# Patient Record
Sex: FEMALE | Race: OTHER | HISPANIC OR LATINO | URBAN - METROPOLITAN AREA
[De-identification: names, ages, dates, MRNs, and addresses within clinical notes are randomized per-mention and may not be internally consistent; named-entity substitution may affect disease eponyms.]

---

## 2017-03-11 ENCOUNTER — EMERGENCY (EMERGENCY)
Facility: HOSPITAL | Age: 32
LOS: 1 days | Discharge: HOME | End: 2017-03-11
Attending: STUDENT IN AN ORGANIZED HEALTH CARE EDUCATION/TRAINING PROGRAM | Admitting: STUDENT IN AN ORGANIZED HEALTH CARE EDUCATION/TRAINING PROGRAM
Payer: COMMERCIAL

## 2017-03-11 VITALS
HEART RATE: 81 BPM | HEIGHT: 61 IN | DIASTOLIC BLOOD PRESSURE: 81 MMHG | OXYGEN SATURATION: 100 % | SYSTOLIC BLOOD PRESSURE: 132 MMHG | WEIGHT: 149.91 LBS | TEMPERATURE: 98 F | RESPIRATION RATE: 18 BRPM

## 2017-03-11 DIAGNOSIS — R07.89 OTHER CHEST PAIN: ICD-10-CM

## 2017-03-11 DIAGNOSIS — I10 ESSENTIAL (PRIMARY) HYPERTENSION: ICD-10-CM

## 2017-03-11 PROCEDURE — 71020: CPT | Mod: 26

## 2017-03-11 PROCEDURE — 93010 ELECTROCARDIOGRAM REPORT: CPT

## 2017-03-11 PROCEDURE — 99285 EMERGENCY DEPT VISIT HI MDM: CPT

## 2017-03-11 RX ORDER — ACETAMINOPHEN 500 MG
975 TABLET ORAL ONCE
Qty: 0 | Refills: 0 | Status: COMPLETED | OUTPATIENT
Start: 2017-03-11 | End: 2017-03-11

## 2017-03-11 RX ADMIN — Medication 975 MILLIGRAM(S): at 23:03

## 2017-03-11 NOTE — ED ADULT TRIAGE NOTE - RESPIRATORY RATE (BREATHS/MIN)
18 I have reviewed and agree with all pertinent clinical information, including history and physical exam and agree with treatment plan of the PA.

## 2017-03-11 NOTE — ED PROVIDER NOTE - NS ED MD SCRIBE ATTENDING SCRIBE SECTIONS
PHYSICAL EXAM/PAST MEDICAL/SURGICAL/SOCIAL HISTORY/REVIEW OF SYSTEMS/VITAL SIGNS( Pullset)/INTAKE ASSESSMENT/SCREENINGS/PROGRESS NOTE/CONSULTATIONS/SHIFT CHANGE/HIV/RESULTS/HISTORY OF PRESENT ILLNESS/DISPOSITION

## 2017-03-11 NOTE — ED PROVIDER NOTE - OBJECTIVE STATEMENT
hx of aortic insufficiency and HTN who presents to the ED c/o CP that onset today. Pain is described as pressure "As if ribs pushing inwards". Denies radiation. Has had similar sx over the past week. Worse with change in position. Denies fever, chills, lifting, running, HA, blurry vision, numbness, tingling, back pain, leg pain or diaphoresis. Had a normal echo and stress test 1 year ago. Took ibuprofen without relieve. Had similar sx about 2 years ago with swelling to her ankles. PCP Dr. Jimenez.  No further complaints at this time. hx of aortic insufficiency and HTN who presents to the ED c/o CP that onset today. Pain is described as pressure "As if ribs pushing inwards". Denies radiation. Has had similar sx over the past week. Worse with change in position and palpation. Denies fever, chills, lifting, running, HA, blurry vision, numbness, tingling, back pain, leg pain or diaphoresis. Had a normal echo and stress test 1 year ago. Took ibuprofen without relieve. Had similar sx about 2 years ago with swelling to her ankles. PCP Dr. Jimenez.  No further complaints at this time.

## 2017-03-12 PROBLEM — Z00.00 ENCOUNTER FOR PREVENTIVE HEALTH EXAMINATION: Status: ACTIVE | Noted: 2017-03-12

## 2017-03-12 LAB
ALBUMIN SERPL ELPH-MCNC: 4.7 G/DL — SIGNIFICANT CHANGE UP (ref 3.3–5.2)
ALP SERPL-CCNC: 61 U/L — SIGNIFICANT CHANGE UP (ref 40–120)
ALT FLD-CCNC: 14 U/L — SIGNIFICANT CHANGE UP
ANION GAP SERPL CALC-SCNC: 14 MMOL/L — SIGNIFICANT CHANGE UP (ref 5–17)
AST SERPL-CCNC: 22 U/L — SIGNIFICANT CHANGE UP
BILIRUB SERPL-MCNC: 0.4 MG/DL — SIGNIFICANT CHANGE UP (ref 0.4–2)
BUN SERPL-MCNC: 14 MG/DL — SIGNIFICANT CHANGE UP (ref 8–20)
CALCIUM SERPL-MCNC: 9.4 MG/DL — SIGNIFICANT CHANGE UP (ref 8.6–10.2)
CHLORIDE SERPL-SCNC: 100 MMOL/L — SIGNIFICANT CHANGE UP (ref 98–107)
CO2 SERPL-SCNC: 26 MMOL/L — SIGNIFICANT CHANGE UP (ref 22–29)
CREAT SERPL-MCNC: 0.62 MG/DL — SIGNIFICANT CHANGE UP (ref 0.5–1.3)
D DIMER BLD IA.RAPID-MCNC: 155 NG/ML DDU — SIGNIFICANT CHANGE UP
ERYTHROCYTE [SEDIMENTATION RATE] IN BLOOD: 20 MM/HR — SIGNIFICANT CHANGE UP (ref 0–20)
GLUCOSE SERPL-MCNC: 101 MG/DL — SIGNIFICANT CHANGE UP (ref 70–115)
HCT VFR BLD CALC: 40.1 % — SIGNIFICANT CHANGE UP (ref 37–47)
HGB BLD-MCNC: 13.8 G/DL — SIGNIFICANT CHANGE UP (ref 12–16)
LIDOCAIN IGE QN: 47 U/L — SIGNIFICANT CHANGE UP (ref 22–51)
MCHC RBC-ENTMCNC: 31.2 PG — HIGH (ref 27–31)
MCHC RBC-ENTMCNC: 34.4 G/DL — SIGNIFICANT CHANGE UP (ref 32–36)
MCV RBC AUTO: 90.5 FL — SIGNIFICANT CHANGE UP (ref 81–99)
PLATELET # BLD AUTO: 262 K/UL — SIGNIFICANT CHANGE UP (ref 150–400)
POTASSIUM SERPL-MCNC: 3.3 MMOL/L — LOW (ref 3.5–5.3)
POTASSIUM SERPL-SCNC: 3.3 MMOL/L — LOW (ref 3.5–5.3)
PROT SERPL-MCNC: 8.4 G/DL — SIGNIFICANT CHANGE UP (ref 6.6–8.7)
RBC # BLD: 4.43 M/UL — SIGNIFICANT CHANGE UP (ref 4.4–5.2)
RBC # FLD: 12.2 % — SIGNIFICANT CHANGE UP (ref 11–15.6)
SODIUM SERPL-SCNC: 140 MMOL/L — SIGNIFICANT CHANGE UP (ref 135–145)
TROPONIN T SERPL-MCNC: <0.01 NG/ML — SIGNIFICANT CHANGE UP (ref 0–0.06)
WBC # BLD: 9 K/UL — SIGNIFICANT CHANGE UP (ref 4.8–10.8)
WBC # FLD AUTO: 9 K/UL — SIGNIFICANT CHANGE UP (ref 4.8–10.8)

## 2017-03-12 PROCEDURE — 80053 COMPREHEN METABOLIC PANEL: CPT

## 2017-03-12 PROCEDURE — 71046 X-RAY EXAM CHEST 2 VIEWS: CPT

## 2017-03-12 PROCEDURE — 85652 RBC SED RATE AUTOMATED: CPT

## 2017-03-12 PROCEDURE — 36415 COLL VENOUS BLD VENIPUNCTURE: CPT

## 2017-03-12 PROCEDURE — 84484 ASSAY OF TROPONIN QUANT: CPT

## 2017-03-12 PROCEDURE — 85027 COMPLETE CBC AUTOMATED: CPT

## 2017-03-12 PROCEDURE — 83690 ASSAY OF LIPASE: CPT

## 2017-03-12 PROCEDURE — 85379 FIBRIN DEGRADATION QUANT: CPT

## 2017-03-12 PROCEDURE — 93005 ELECTROCARDIOGRAM TRACING: CPT

## 2017-03-12 PROCEDURE — 99283 EMERGENCY DEPT VISIT LOW MDM: CPT | Mod: 25

## 2017-12-13 ENCOUNTER — EMERGENCY (EMERGENCY)
Facility: HOSPITAL | Age: 32
LOS: 1 days | Discharge: HOME | End: 2017-12-13
Attending: EMERGENCY MEDICINE | Admitting: EMERGENCY MEDICINE
Payer: COMMERCIAL

## 2017-12-13 VITALS
WEIGHT: 139.99 LBS | SYSTOLIC BLOOD PRESSURE: 156 MMHG | HEIGHT: 62 IN | TEMPERATURE: 98 F | HEART RATE: 101 BPM | RESPIRATION RATE: 20 BRPM | DIASTOLIC BLOOD PRESSURE: 94 MMHG | OXYGEN SATURATION: 99 %

## 2017-12-13 VITALS
RESPIRATION RATE: 17 BRPM | HEART RATE: 72 BPM | DIASTOLIC BLOOD PRESSURE: 78 MMHG | SYSTOLIC BLOOD PRESSURE: 133 MMHG | TEMPERATURE: 98 F | OXYGEN SATURATION: 97 %

## 2017-12-13 LAB
ANION GAP SERPL CALC-SCNC: 13 MMOL/L — SIGNIFICANT CHANGE UP (ref 5–17)
BUN SERPL-MCNC: 13 MG/DL — SIGNIFICANT CHANGE UP (ref 8–20)
CALCIUM SERPL-MCNC: 9.8 MG/DL — SIGNIFICANT CHANGE UP (ref 8.6–10.2)
CHLORIDE SERPL-SCNC: 97 MMOL/L — LOW (ref 98–107)
CO2 SERPL-SCNC: 24 MMOL/L — SIGNIFICANT CHANGE UP (ref 22–29)
CREAT SERPL-MCNC: 0.66 MG/DL — SIGNIFICANT CHANGE UP (ref 0.5–1.3)
GLUCOSE SERPL-MCNC: 90 MG/DL — SIGNIFICANT CHANGE UP (ref 70–115)
HCT VFR BLD CALC: 40 % — SIGNIFICANT CHANGE UP (ref 37–47)
HGB BLD-MCNC: 13.5 G/DL — SIGNIFICANT CHANGE UP (ref 12–16)
MCHC RBC-ENTMCNC: 30.1 PG — SIGNIFICANT CHANGE UP (ref 27–31)
MCHC RBC-ENTMCNC: 33.8 G/DL — SIGNIFICANT CHANGE UP (ref 32–36)
MCV RBC AUTO: 89.3 FL — SIGNIFICANT CHANGE UP (ref 81–99)
PLATELET # BLD AUTO: 172 K/UL — SIGNIFICANT CHANGE UP (ref 150–400)
POTASSIUM SERPL-MCNC: 4.3 MMOL/L — SIGNIFICANT CHANGE UP (ref 3.5–5.3)
POTASSIUM SERPL-SCNC: 4.3 MMOL/L — SIGNIFICANT CHANGE UP (ref 3.5–5.3)
RBC # BLD: 4.48 M/UL — SIGNIFICANT CHANGE UP (ref 4.4–5.2)
RBC # FLD: 12.4 % — SIGNIFICANT CHANGE UP (ref 11–15.6)
SODIUM SERPL-SCNC: 134 MMOL/L — LOW (ref 135–145)
TROPONIN T SERPL-MCNC: <0.01 NG/ML — SIGNIFICANT CHANGE UP (ref 0–0.06)
TROPONIN T SERPL-MCNC: <0.01 NG/ML — SIGNIFICANT CHANGE UP (ref 0–0.06)
WBC # BLD: 4.7 K/UL — LOW (ref 4.8–10.8)
WBC # FLD AUTO: 4.7 K/UL — LOW (ref 4.8–10.8)

## 2017-12-13 PROCEDURE — 99283 EMERGENCY DEPT VISIT LOW MDM: CPT | Mod: 25

## 2017-12-13 PROCEDURE — 99285 EMERGENCY DEPT VISIT HI MDM: CPT

## 2017-12-13 PROCEDURE — 85027 COMPLETE CBC AUTOMATED: CPT

## 2017-12-13 PROCEDURE — 84484 ASSAY OF TROPONIN QUANT: CPT

## 2017-12-13 PROCEDURE — 36415 COLL VENOUS BLD VENIPUNCTURE: CPT

## 2017-12-13 PROCEDURE — 80048 BASIC METABOLIC PNL TOTAL CA: CPT

## 2017-12-13 RX ORDER — METOPROLOL TARTRATE 50 MG
75 TABLET ORAL
Qty: 0 | Refills: 0 | COMMUNITY

## 2017-12-13 RX ORDER — LABETALOL HCL 100 MG
400 TABLET ORAL
Qty: 0 | Refills: 0 | COMMUNITY

## 2017-12-13 RX ORDER — ASPIRIN/CALCIUM CARB/MAGNESIUM 324 MG
325 TABLET ORAL ONCE
Qty: 0 | Refills: 0 | Status: COMPLETED | OUTPATIENT
Start: 2017-12-13 | End: 2017-12-13

## 2017-12-13 RX ORDER — MONTELUKAST 4 MG/1
1 TABLET, CHEWABLE ORAL
Qty: 0 | Refills: 0 | COMMUNITY

## 2017-12-13 RX ADMIN — Medication 325 MILLIGRAM(S): at 13:55

## 2017-12-13 NOTE — ED ADULT TRIAGE NOTE - CHIEF COMPLAINT QUOTE
pt states that she has left sided chest pain for on and off for one month . pt states that it has increased in intensity and she now has left arm pain. pt has seen her PMD and cardiologist. pt states that she is to have a stress echo Monday

## 2017-12-13 NOTE — ED PROVIDER NOTE - NS ED ROS FT
Denies HA, dizziness, blurry vision, sore throat, coughing, SOB,  nausea, vomiting, abdominal pain, flank pain, diarrhea, constipation, blood in stool, urinary frequency/urgency/dysuria, hematuria, LE edema, numbness, weakness or rashes.

## 2017-12-13 NOTE — ED PROVIDER NOTE - CONSTITUTIONAL, MLM
normal... Well appearing, well nourished, awake, alert, oriented to person, place, time/situation and in no apparent distress. Patient is tearful.

## 2017-12-13 NOTE — ED PROVIDER NOTE - OBJECTIVE STATEMENT
Patient presents with left sided chest pain which is constant, but intermittently worsening over the past month. She saw her cardiologist and her PMD who did normal EKG's and is scheduled for a stress echo with her cardiologist in 5 days, but the pain continues to worsen so she came to the ED. She denies any associated SOB, nausea, vomiting, diaphoresis. She denies any inciting injury. She was told by her cardiologist to take aspirin, which she took 325 mg x 3 TID for a week, but self D/C'd because she had tinnitis, which resolved when she stopped. She notes that the ASA did help with the pain. She has otherwise been well, denies fevers, coughing, sore throat, abdominal pain, diarrhea urinary symptoms or LE edema.

## 2017-12-13 NOTE — ED PROVIDER NOTE - MEDICAL DECISION MAKING DETAILS
Patient presents with chest pain x 1 month worse with movement and radiation to her left arm. She has point tenderness over the sternal cartilage. EKG shows TWI in III and aVF. Will treat with 325 ASA (patient likely has ASA toxicity when she was taking large amounts of ASA daily), cycle troponin for ACS rule out.

## 2017-12-13 NOTE — ED PROVIDER NOTE - CHPI ED SYMPTOMS NEG
no nausea/no syncope/no cough/no vomiting/no dizziness/no fever/no chills/no shortness of breath/no diaphoresis/no back pain

## 2019-12-13 PROBLEM — I10 ESSENTIAL (PRIMARY) HYPERTENSION: Chronic | Status: ACTIVE | Noted: 2017-12-13

## 2020-02-12 ENCOUNTER — APPOINTMENT (OUTPATIENT)
Dept: DERMATOLOGY | Facility: CLINIC | Age: 35
End: 2020-02-12

## 2022-04-13 ENCOUNTER — APPOINTMENT (OUTPATIENT)
Dept: ORTHOPEDIC SURGERY | Facility: CLINIC | Age: 37
End: 2022-04-13

## 2022-05-16 PROBLEM — Z00.00 ENCOUNTER FOR PREVENTIVE HEALTH EXAMINATION: Noted: 2022-05-16

## 2022-10-19 ENCOUNTER — APPOINTMENT (OUTPATIENT)
Dept: ORTHOPEDIC SURGERY | Facility: CLINIC | Age: 37
End: 2022-10-19

## 2022-10-19 VITALS — HEIGHT: 61 IN | WEIGHT: 165 LBS | BODY MASS INDEX: 31.15 KG/M2

## 2022-10-19 DIAGNOSIS — I10 ESSENTIAL (PRIMARY) HYPERTENSION: ICD-10-CM

## 2022-10-19 DIAGNOSIS — E78.00 PURE HYPERCHOLESTEROLEMIA, UNSPECIFIED: ICD-10-CM

## 2022-10-19 PROCEDURE — 99214 OFFICE O/P EST MOD 30 MIN: CPT

## 2022-10-19 NOTE — HISTORY OF PRESENT ILLNESS
[5] : 5 [3] : 3 [Full time] : Work status: full time [de-identified] : 38 y/o female presents for re-evaluation. Patient reports that since her last visit she completed her PT trials and has been following the HEP provided to her previously. Patient reports a recurrence of pain 1 week ago that started after she lifted her child. Patient c/o lower back pain radiating into her bilateral hips.  [] : no [de-identified] : p/t [de-identified] :

## 2022-10-19 NOTE — ASSESSMENT
[FreeTextEntry1] : 38 y/o female with lumbar myofascial strain (Resolving). I advised the patient to continue her current HEP. Advised the patient to continue taking 600mg Ibuprofen PRN. Advised the patient that if her pain doesn't improve in the next week, we will consider obtaining MRI's of the lumbar spine.  I would like to follow up with the patient on an as needed basis moving forward. \par \par Prior to appointment and during encounter with patient extensive medical records were reviewed including but not limited to, hospital records, out patient records, imaging results, and lab data. During this appointment the patient was examined, diagnoses were discussed and explained in a face to face manner. In addition extensive time was spent reviewing aforementioned diagnostic studies. Counseling including abnormal image results, differential diagnoses, treatment options, risk and benefits, lifestyle changes, current condition, and current medications was performed. Patient's comments, questions, and concerns were address and patient verbalized understanding. Based on this patient's presentation at our office, which is an orthopedic spine surgeon's office, this patient inherently / intrinsically has a risk, however minute, of developing issues such as Cauda equina syndrome, bowel and bladder changes, or progression of motor or neurological deficits such as paralysis which may be permanent.\par \par I, Christopher Leblanc, attest that this documentation has been prepared under the direction and in the presence of provider Krzysztof Mcqueen MD.

## 2022-10-19 NOTE — PHYSICAL EXAM
[5___] : right extensor hallicus longus 5[unfilled]/5 [de-identified] : Constitutional:\par -  General Appearance: \par Unremarkable\par Body Habitus\par Well Developed \par Well Nourished\par Body Habitus\par No Deformities\par Well Groomed\par \par Ability To communicate:\par Normal\par \par Neurologic: \par Global sensation is intact to upper and lower extremities.  See examination of Neck and/or Spine for exceptions.\par Orientation to Time, Place and Person is: Normal\par Mood And Affect is Normal\par \par Skin:\par -  Head/Face, Right Upper/Lower Extremity, Left Upper/Lower Extremity: Normal\par See Examination of Neck and/or Spine for exceptions\par \par Cardiovascular:\par Peripheral Cardiovascular System is Normal\par \par Palpation of Lymph Nodes:\par Normal Palpation of lymph nodes in: Axilla, Cervical, Inguinal\par Abnormal Palpation of lymph nodes in: None  [] : non-antalgic [FreeTextEntry8] : Right PSIS pain

## 2022-11-09 ENCOUNTER — EMERGENCY (EMERGENCY)
Facility: HOSPITAL | Age: 37
LOS: 1 days | Discharge: HOME | End: 2022-11-09
Attending: STUDENT IN AN ORGANIZED HEALTH CARE EDUCATION/TRAINING PROGRAM
Payer: COMMERCIAL

## 2022-11-09 VITALS
RESPIRATION RATE: 18 BRPM | OXYGEN SATURATION: 99 % | DIASTOLIC BLOOD PRESSURE: 89 MMHG | HEART RATE: 70 BPM | SYSTOLIC BLOOD PRESSURE: 139 MMHG | TEMPERATURE: 99 F

## 2022-11-09 VITALS
RESPIRATION RATE: 16 BRPM | TEMPERATURE: 98 F | SYSTOLIC BLOOD PRESSURE: 143 MMHG | HEART RATE: 82 BPM | DIASTOLIC BLOOD PRESSURE: 76 MMHG | WEIGHT: 164.91 LBS | HEIGHT: 67 IN | OXYGEN SATURATION: 99 %

## 2022-11-09 LAB
ALBUMIN SERPL ELPH-MCNC: 5 G/DL — SIGNIFICANT CHANGE UP (ref 3.3–5.2)
ALP SERPL-CCNC: 58 U/L — SIGNIFICANT CHANGE UP (ref 40–120)
ALT FLD-CCNC: 19 U/L — SIGNIFICANT CHANGE UP
ANION GAP SERPL CALC-SCNC: 12 MMOL/L — SIGNIFICANT CHANGE UP (ref 5–17)
AST SERPL-CCNC: 25 U/L — SIGNIFICANT CHANGE UP
BASOPHILS # BLD AUTO: 0.03 K/UL — SIGNIFICANT CHANGE UP (ref 0–0.2)
BASOPHILS NFR BLD AUTO: 0.6 % — SIGNIFICANT CHANGE UP (ref 0–2)
BILIRUB SERPL-MCNC: 0.5 MG/DL — SIGNIFICANT CHANGE UP (ref 0.4–2)
BLD GP AB SCN SERPL QL: SIGNIFICANT CHANGE UP
BUN SERPL-MCNC: 14.1 MG/DL — SIGNIFICANT CHANGE UP (ref 8–20)
CALCIUM SERPL-MCNC: 9.7 MG/DL — SIGNIFICANT CHANGE UP (ref 8.4–10.5)
CHLORIDE SERPL-SCNC: 99 MMOL/L — SIGNIFICANT CHANGE UP (ref 96–108)
CO2 SERPL-SCNC: 25 MMOL/L — SIGNIFICANT CHANGE UP (ref 22–29)
CREAT SERPL-MCNC: 0.77 MG/DL — SIGNIFICANT CHANGE UP (ref 0.5–1.3)
EGFR: 103 ML/MIN/1.73M2 — SIGNIFICANT CHANGE UP
EOSINOPHIL # BLD AUTO: 0.07 K/UL — SIGNIFICANT CHANGE UP (ref 0–0.5)
EOSINOPHIL NFR BLD AUTO: 1.3 % — SIGNIFICANT CHANGE UP (ref 0–6)
GLUCOSE SERPL-MCNC: 85 MG/DL — SIGNIFICANT CHANGE UP (ref 70–99)
HCG SERPL-ACNC: <4 MIU/ML — SIGNIFICANT CHANGE UP
HCT VFR BLD CALC: 38.3 % — SIGNIFICANT CHANGE UP (ref 34.5–45)
HGB BLD-MCNC: 13.2 G/DL — SIGNIFICANT CHANGE UP (ref 11.5–15.5)
IMM GRANULOCYTES NFR BLD AUTO: 0.2 % — SIGNIFICANT CHANGE UP (ref 0–0.9)
LACTATE BLDV-MCNC: 2.2 MMOL/L — HIGH (ref 0.5–2)
LIDOCAIN IGE QN: 41 U/L — SIGNIFICANT CHANGE UP (ref 22–51)
LYMPHOCYTES # BLD AUTO: 1.25 K/UL — SIGNIFICANT CHANGE UP (ref 1–3.3)
LYMPHOCYTES # BLD AUTO: 23.8 % — SIGNIFICANT CHANGE UP (ref 13–44)
MCHC RBC-ENTMCNC: 30.7 PG — SIGNIFICANT CHANGE UP (ref 27–34)
MCHC RBC-ENTMCNC: 34.5 GM/DL — SIGNIFICANT CHANGE UP (ref 32–36)
MCV RBC AUTO: 89.1 FL — SIGNIFICANT CHANGE UP (ref 80–100)
MONOCYTES # BLD AUTO: 0.47 K/UL — SIGNIFICANT CHANGE UP (ref 0–0.9)
MONOCYTES NFR BLD AUTO: 8.9 % — SIGNIFICANT CHANGE UP (ref 2–14)
NEUTROPHILS # BLD AUTO: 3.43 K/UL — SIGNIFICANT CHANGE UP (ref 1.8–7.4)
NEUTROPHILS NFR BLD AUTO: 65.2 % — SIGNIFICANT CHANGE UP (ref 43–77)
PLATELET # BLD AUTO: 235 K/UL — SIGNIFICANT CHANGE UP (ref 150–400)
POTASSIUM SERPL-MCNC: 4.5 MMOL/L — SIGNIFICANT CHANGE UP (ref 3.5–5.3)
POTASSIUM SERPL-SCNC: 4.5 MMOL/L — SIGNIFICANT CHANGE UP (ref 3.5–5.3)
PROT SERPL-MCNC: 7.9 G/DL — SIGNIFICANT CHANGE UP (ref 6.6–8.7)
RBC # BLD: 4.3 M/UL — SIGNIFICANT CHANGE UP (ref 3.8–5.2)
RBC # FLD: 11.9 % — SIGNIFICANT CHANGE UP (ref 10.3–14.5)
SARS-COV-2 RNA SPEC QL NAA+PROBE: SIGNIFICANT CHANGE UP
SODIUM SERPL-SCNC: 136 MMOL/L — SIGNIFICANT CHANGE UP (ref 135–145)
WBC # BLD: 5.26 K/UL — SIGNIFICANT CHANGE UP (ref 3.8–10.5)
WBC # FLD AUTO: 5.26 K/UL — SIGNIFICANT CHANGE UP (ref 3.8–10.5)

## 2022-11-09 PROCEDURE — 84702 CHORIONIC GONADOTROPIN TEST: CPT

## 2022-11-09 PROCEDURE — 86900 BLOOD TYPING SEROLOGIC ABO: CPT

## 2022-11-09 PROCEDURE — 36415 COLL VENOUS BLD VENIPUNCTURE: CPT

## 2022-11-09 PROCEDURE — 86850 RBC ANTIBODY SCREEN: CPT

## 2022-11-09 PROCEDURE — 74177 CT ABD & PELVIS W/CONTRAST: CPT | Mod: 26,MA

## 2022-11-09 PROCEDURE — 96361 HYDRATE IV INFUSION ADD-ON: CPT

## 2022-11-09 PROCEDURE — 86901 BLOOD TYPING SEROLOGIC RH(D): CPT

## 2022-11-09 PROCEDURE — U0003: CPT

## 2022-11-09 PROCEDURE — 80053 COMPREHEN METABOLIC PANEL: CPT

## 2022-11-09 PROCEDURE — 99284 EMERGENCY DEPT VISIT MOD MDM: CPT | Mod: 25

## 2022-11-09 PROCEDURE — 74177 CT ABD & PELVIS W/CONTRAST: CPT | Mod: MA

## 2022-11-09 PROCEDURE — 83690 ASSAY OF LIPASE: CPT

## 2022-11-09 PROCEDURE — 85025 COMPLETE CBC W/AUTO DIFF WBC: CPT

## 2022-11-09 PROCEDURE — 99285 EMERGENCY DEPT VISIT HI MDM: CPT

## 2022-11-09 PROCEDURE — U0005: CPT

## 2022-11-09 PROCEDURE — 83605 ASSAY OF LACTIC ACID: CPT

## 2022-11-09 PROCEDURE — 96360 HYDRATION IV INFUSION INIT: CPT | Mod: XU

## 2022-11-09 RX ORDER — SODIUM CHLORIDE 9 MG/ML
1000 INJECTION INTRAMUSCULAR; INTRAVENOUS; SUBCUTANEOUS ONCE
Refills: 0 | Status: COMPLETED | OUTPATIENT
Start: 2022-11-09 | End: 2022-11-09

## 2022-11-09 RX ORDER — RADIOPAQUE PVC MARKERS/BARIUM 24MARKERS
900 CAPSULE ORAL ONCE
Refills: 0 | Status: COMPLETED | OUTPATIENT
Start: 2022-11-09 | End: 2022-11-09

## 2022-11-09 RX ADMIN — SODIUM CHLORIDE 1000 MILLILITER(S): 9 INJECTION INTRAMUSCULAR; INTRAVENOUS; SUBCUTANEOUS at 11:41

## 2022-11-09 RX ADMIN — Medication 900 MILLILITER(S): at 11:10

## 2022-11-09 RX ADMIN — SODIUM CHLORIDE 1000 MILLILITER(S): 9 INJECTION INTRAMUSCULAR; INTRAVENOUS; SUBCUTANEOUS at 13:57

## 2022-11-09 RX ADMIN — SODIUM CHLORIDE 1000 MILLILITER(S): 9 INJECTION INTRAMUSCULAR; INTRAVENOUS; SUBCUTANEOUS at 11:11

## 2022-11-09 NOTE — ED ADULT NURSE NOTE - OBJECTIVE STATEMENT
Pt. is a 36 yo F who p/w c/o abdominal pain. Pt. A&Ox4 and amb. Pmhx HTN. Pt. endorsing suprapubic pain ongoing for multiple days. Endorses GERD like symptoms as well. Pt. states she noticed a small amount of blood in her stool this morning prompting her to come to the ED. Denies N/V/D/fevers/chills/SOB/CP. Abdomen soft, non-distended, non-tender on palpation. Resp. equal and unlabored b/l. VSS. NAD. #20g PIV established to RAC. Labs sent. Medicated per Md orders. Comfort measures provided, safety measures implemented, call bell in reach. MD at bedside.

## 2022-11-09 NOTE — ED STATDOCS - NS ED ATTENDING STATEMENT MOD
This was a shared visit with the LARISA. I reviewed and verified the documentation and independently performed the documented:

## 2022-11-09 NOTE — ED STATDOCS - ATTENDING APP SHARED VISIT CONTRIBUTION OF CARE
I, Lacy Morrison, performed the initial face to face bedside interview with this patient regarding history of present illness, review of symptoms and relevant past medical, social and family history.  I completed an independent physical examination.  I was the initial provider who evaluated this patient. I have signed out the follow up of any pending tests (i.e. labs, radiological studies) to the ACP.  I have communicated the patient’s plan of care and disposition with the ACP.  The history, relevant review of systems, past medical and surgical history, medical decision making, and physical examination was documented by the scribe in my presence and I attest to the accuracy of the documentation.

## 2022-11-09 NOTE — ED STATDOCS - CARE PROVIDER_API CALL
Abhishek Salas)  Gastroenterology; Internal Medicine  [address] [place] [address]  Phone: [phone]  Fax: [phone]  Follow Up Time:

## 2022-11-09 NOTE — ED STATDOCS - PATIENT PORTAL LINK FT
You can access the FollowMyHealth Patient Portal offered by [facility] by registering at the following website: [email or web address]. By joining [email or web address] FollowMyHealth portal, you will also be able to view your health information using other applications (apps) compatible with our system.

## 2022-11-09 NOTE — ED ADULT TRIAGE NOTE - CHIEF COMPLAINT QUOTE
Pt started with abdominal pain a few days ago. Then started to develop indigestion and reflux a few days ago. This morning she had dark red blood in her stool.

## 2022-11-09 NOTE — ED STATDOCS - NSFOLLOWUPINSTRUCTIONS_ED_ALL_ED_FT
- Return to the ED for any new or worsening symptoms.   - Follow up with your doctor within 2-3 days.   - Follow-up with GI doctor for further evaluation of symptoms    Abdominal Pain    Many things can cause abdominal pain. Many times, abdominal pain is not caused by a disease and will improve without treatment. Your health care provider will do a physical exam to determine if there is a dangerous cause of your pain; blood tests and imaging may help determine the cause of your pain. However, in many cases, no cause may be found and you may need further testing as an outpatient. Monitor your abdominal pain for any changes.     SEEK IMMEDIATE MEDICAL CARE IF YOU HAVE ANY OF THE FOLLOWING SYMPTOMS: worsening abdominal pain, uncontrollable vomiting, profuse diarrhea, inability to have bowel movements or pass gas, black or bloody stools, fever accompanying chest pain or back pain, or fainting. These symptoms may represent a serious problem that is an emergency. Do not wait to see if the symptoms will go away. Get medical help right away. Call 911 and do not drive yourself to the hospital.

## 2022-11-09 NOTE — ED STATDOCS - PROGRESS NOTE DETAILS
ERIC Andino: PT evaluated by intake physician. HPI/PE/ROS as noted above. Will follow up plan per intake physician. Pt reassessed, pain minimal, still reporting lower abd cramping. Results reviewed thus far, pending ct scan ERIC Andino: ct negative. results reviewed with pt. pt states she has had some diarrhea past few days with her abd cramping, possible viral enteritis. educated on supportive care for symptoms and BRAT diet. has GI appt scheduled in January. left message with ED referral coordinator to try to expedite appt. provided copy of all results. return precautions discussed

## 2022-11-09 NOTE — ED STATDOCS - GASTROINTESTINAL, MLM
abdomen soft,(+) Mid epigastric, and RLQ TTP, (+) McBurney's, and non-distended. Bowel sounds present; Rectal exam: (+) external, and internal hemorrhoids

## 2022-11-09 NOTE — ED STATDOCS - OBJECTIVE STATEMENT
36 y/o female with PMHx of HTN, and Hemorrhoids presents to ED c/o abdominal pain. Patient reports intermittent diffuse upper abdominal pain, and constant lower abdominal pressure with associated reflux x5 days. This morning, patient reports she passed a small blood clot with stool. Saw her OBGYN 2 days ago had a negative US. Also saw her PMD yesterday, had a negative UA. States only recent take out food was 6 days ago, had a lobster roll from a restaurant.     Denies constipation, N/V/D, fevers, chills, shortness of breath, chest pain  LNMP: 10/31/22

## 2022-11-15 ENCOUNTER — APPOINTMENT (OUTPATIENT)
Dept: GASTROENTEROLOGY | Facility: CLINIC | Age: 37
End: 2022-11-15

## 2022-11-15 VITALS
WEIGHT: 170 LBS | DIASTOLIC BLOOD PRESSURE: 93 MMHG | HEART RATE: 77 BPM | OXYGEN SATURATION: 98 % | RESPIRATION RATE: 14 BRPM | BODY MASS INDEX: 32.1 KG/M2 | SYSTOLIC BLOOD PRESSURE: 150 MMHG | TEMPERATURE: 97.5 F | HEIGHT: 61 IN

## 2022-11-15 DIAGNOSIS — K52.9 NONINFECTIVE GASTROENTERITIS AND COLITIS, UNSPECIFIED: ICD-10-CM

## 2022-11-15 PROCEDURE — 99203 OFFICE O/P NEW LOW 30 MIN: CPT

## 2022-11-15 RX ORDER — OMEPRAZOLE 20 MG/1
20 CAPSULE, DELAYED RELEASE ORAL
Qty: 30 | Refills: 0 | Status: ACTIVE | COMMUNITY
Start: 2022-11-08

## 2022-11-15 RX ORDER — PREDNISONE 10 MG/1
10 TABLET ORAL
Qty: 12 | Refills: 0 | Status: DISCONTINUED | COMMUNITY
Start: 2022-05-23

## 2022-11-15 RX ORDER — LABETALOL HYDROCHLORIDE 300 MG/1
300 TABLET, FILM COATED ORAL
Qty: 180 | Refills: 0 | Status: ACTIVE | COMMUNITY
Start: 2022-09-07

## 2022-11-15 RX ORDER — CLARITHROMYCIN 500 MG/1
500 TABLET, FILM COATED ORAL
Qty: 14 | Refills: 0 | Status: DISCONTINUED | COMMUNITY
Start: 2022-05-16

## 2022-11-15 RX ORDER — ATORVASTATIN CALCIUM 10 MG/1
10 TABLET, FILM COATED ORAL
Qty: 90 | Refills: 0 | Status: ACTIVE | COMMUNITY
Start: 2022-09-07

## 2022-11-15 RX ORDER — MONTELUKAST 10 MG/1
10 TABLET, FILM COATED ORAL
Qty: 90 | Refills: 0 | Status: ACTIVE | COMMUNITY
Start: 2022-09-07

## 2022-11-16 NOTE — HISTORY OF PRESENT ILLNESS
[FreeTextEntry1] : 37-year-old white female with history of hyperlipidemia and asthma on medications for both.  History of hemorrhoids since pregnancy 8 years ago minimally symptomatic from these.\par Otherwise healthy and taking no unusual medications.\par Patient reports that she began to feel ill on or about 11 7 which was about 3 days after consumption of a lobster male.  No other family members or friends became ill after a similar meal.\par Patient noted diffuse upper abdominal crampy pain rating from the epigastric to the periumbilical region.  No nausea or vomiting.  No fever.  No rash.  No diarrhea.  Had noted 1 loose bowel movement.  There was no prior history of aspirin or NSAID use.  No prior endoscopy.  No history of peptic ulcer disease.  2 days later on 11/9 patient presented to [facility] ED for evaluation.  She reported no recent antibiotics.  No travel.  No ill contacts.  Blood work in the ER was completely normal.  Lipase was normal LFTs were normal white count was normal.  A CAT scan abdomen and pelvis was performed with oral and IV contrast.  CAT scan was completely normal.  Patient was started on omeprazole empirically and given a 2-week course.\par Patient did see GYN who performed a pelvic sonogram which supposedly was negative.  No abnormalities on GYN exam or reported.\par After few days symptoms began to improve.  Patient is now significantly better over the past 2 days.  She has not developed any other symptoms.  Abdominal pain has nearly resolved.  Appetite is improving.  Bowel movements have remained unremarkable.  No diarrhea.  No hematochezia.  Family history is negative for colorectal neoplasia.

## 2022-11-16 NOTE — REASON FOR VISIT
[Consultation] : a consultation visit [FreeTextEntry1] : ER referral for upper abdominal pain and pelvic pressure.

## 2022-11-16 NOTE — ASSESSMENT
[FreeTextEntry1] : Unexplained bout of abdominal pain.  Symptoms were short-lived and appears to have resolved.  Probably gastroenteritis without diarrhea.  Probably viral in etiology.  Spontaneously better.  Patient had been prescribed a 2-week course of omeprazole by ED physician.  Advised patient to complete that course of therapy then discontinue the omeprazole and observe for any recurrence of symptoms.\par No current need for upper endoscopy or colonoscopy.\par GI office follow-up on as needed basis.  Call for problems.\par Regular diet.

## 2022-11-16 NOTE — CONSULT LETTER
[Dear  ___] : Dear  [unfilled], [Consult Letter:] : I had the pleasure of evaluating your patient, [unfilled]. [Please see my note below.] : Please see my note below. [Consult Closing:] : Thank you very much for allowing me to participate in the care of this patient.  If you have any questions, please do not hesitate to contact me. [Sincerely,] : Sincerely, [FreeTextEntry1] : Epigastric pain of short duration and seemingly resolved with short course of PPI medication.  Recent evaluation at [facility] ED with blood work and CT scan are all unrevealing.  Symptoms have abated spontaneously.  Patient will complete her 2-week course of PPI therapy and discontinue medication and observe.  Currently no intervention planned. [FreeTextEntry3] : Todd Asif MD FACG\par Diplomate American Board of Internal Medicine and Gastroenterolgy\par [facility]\par

## 2022-11-16 NOTE — PHYSICAL EXAM
[Alert] : alert [Normal Voice/Communication] : normal voice/communication [Healthy Appearing] : healthy appearing [No Acute Distress] : no acute distress [Sclera] : the sclera and conjunctiva were normal [Hearing Threshold Finger Rub Not [place]] : hearing was normal [Normal Lips/Gums] : the lips and gums were normal [Oropharynx] : the oropharynx was normal [Normal Appearance] : the appearance of the neck was normal [No Neck Mass] : no neck mass was observed [No Respiratory Distress] : no respiratory distress [No Acc Muscle Use] : no accessory muscle use [Respiration, Rhythm And Depth] : normal respiratory rhythm and effort [Auscultation Breath Sounds / Voice Sounds] : lungs were clear to auscultation bilaterally [Heart Rate And Rhythm] : heart rate was normal and rhythm regular [Normal S1, S2] : normal S1 and S2 [Murmurs] : no murmurs [Bowel Sounds] : normal bowel sounds [Abdomen Tenderness] : non-tender [No Masses] : no abdominal mass palpated [Abdomen Soft] : soft [] : no hepatosplenomegaly [Oriented To Time, Place, And Person] : oriented to person, place, and time [de-identified] : Robust in appearance.  Anicteric sclera.  Moist mucosa. [de-identified] : No pharyngitis.  No adenopathy.  No tonsillomegaly.  Mallampati #2. [de-identified] : Not performed. [de-identified] : Normal. [de-identified] : Normal. [de-identified] : Normal. [de-identified] : Normal.

## 2022-12-16 ENCOUNTER — APPOINTMENT (OUTPATIENT)
Dept: ORTHOPEDIC SURGERY | Facility: CLINIC | Age: 37
End: 2022-12-16

## 2022-12-16 VITALS — WEIGHT: 170 LBS | HEIGHT: 61 IN | BODY MASS INDEX: 32.1 KG/M2

## 2022-12-16 PROCEDURE — 99213 OFFICE O/P EST LOW 20 MIN: CPT

## 2022-12-21 ENCOUNTER — APPOINTMENT (OUTPATIENT)
Dept: MRI IMAGING | Facility: CLINIC | Age: 37
End: 2022-12-21

## 2022-12-21 NOTE — PHYSICAL EXAM
[5___] : right extensor hallicus longus 5[unfilled]/5 [de-identified] : Constitutional:\par -  General Appearance: \par Unremarkable\par Body Habitus\par Well Developed \par Well Nourished\par Body Habitus\par No Deformities\par Well Groomed\par \par Ability To communicate:\par Normal\par \par Neurologic: \par Global sensation is intact to upper and lower extremities.  See examination of Neck and/or Spine for exceptions.\par Orientation to Time, Place and Person is: Normal\par Mood And Affect is Normal\par \par Skin:\par -  Head/Face, Right Upper/Lower Extremity, Left Upper/Lower Extremity: Normal\par See Examination of Neck and/or Spine for exceptions\par \par Cardiovascular:\par Peripheral Cardiovascular System is Normal\par \par Palpation of Lymph Nodes:\par Normal Palpation of lymph nodes in: Axilla, Cervical, Inguinal\par Abnormal Palpation of lymph nodes in: None  [] : non-antalgic [FreeTextEntry8] : Right PSIS pain

## 2022-12-21 NOTE — HISTORY OF PRESENT ILLNESS
[6] : 6 [3] : 3 [Full time] : Work status: full time [de-identified] : 12/16/22: 36 y/o female presents with re-evaluation of chronic intermittent low back pain. Patient reports that she experiences persistent mechanical symptoms in her lower back despite ongoing physical therapy trial. Due to worsening pain and instability with mechanical symptoms, patient will obtain MRI lumbar spine. Patient has proven refractory to over 6 weeks of previous conservative treatments including physical therapy and HEP. \par \par \par 10/19/22: 36 y/o female presents for re-evaluation. Patient reports that since her last visit she completed her PT trials and has been following the HEP provided to her previously. Patient reports a recurrence of pain 1 week ago that started after she lifted her child. Patient c/o lower back pain radiating into her bilateral hips.  [de-identified] : no

## 2022-12-21 NOTE — ASSESSMENT
[FreeTextEntry1] : 38 y/o female with chronic intermittent low back pain. Due to worsening pain and instability with mechanical symptoms, patient will obtain MRI lumbar spine. Patient has proven refractory to over 6 weeks of previous conservative treatments including physical therapy, HEP, and OTC NSAIDs. I would like to follow up with the patient in 2 weeks to review results of lumbar MRI.  \par \par Prior to appointment and during encounter with patient extensive medical records were reviewed including but not limited to, hospital records, out patient records, imaging results, and lab data. During this appointment the patient was examined, diagnoses were discussed and explained in a face to face manner. In addition extensive time was spent reviewing aforementioned diagnostic studies. Counseling including abnormal image results, differential diagnoses, treatment options, risk and benefits, lifestyle changes, current condition, and current medications was performed. Patient's comments, questions, and concerns were address and patient verbalized understanding. Based on this patient's presentation at our office, which is an orthopedic spine surgeon's office, this patient inherently / intrinsically has a risk, however minute, of developing issues such as Cauda equina syndrome, bowel and bladder changes, or progression of motor or neurological deficits such as paralysis which may be permanent.\par \par Ariela RAMOS attest that this documentation has been prepared under the direction and in the presence of Provider Dr. Krzysztof Mcqueen.

## 2022-12-30 ENCOUNTER — APPOINTMENT (OUTPATIENT)
Dept: ORTHOPEDIC SURGERY | Facility: CLINIC | Age: 37
End: 2022-12-30

## 2023-01-02 ENCOUNTER — RESULT REVIEW (OUTPATIENT)
Age: 38
End: 2023-01-02

## 2023-02-01 ENCOUNTER — APPOINTMENT (OUTPATIENT)
Dept: ORTHOPEDIC SURGERY | Facility: CLINIC | Age: 38
End: 2023-02-01
Payer: COMMERCIAL

## 2023-02-01 VITALS — WEIGHT: 170 LBS | HEIGHT: 61 IN | BODY MASS INDEX: 32.1 KG/M2

## 2023-02-01 DIAGNOSIS — S39.012D STRAIN OF MUSCLE, FASCIA AND TENDON OF LOWER BACK, SUBSEQUENT ENCOUNTER: ICD-10-CM

## 2023-02-01 DIAGNOSIS — G89.29 LOW BACK PAIN, UNSPECIFIED: ICD-10-CM

## 2023-02-01 DIAGNOSIS — M54.50 LOW BACK PAIN, UNSPECIFIED: ICD-10-CM

## 2023-02-01 DIAGNOSIS — M51.36 OTHER INTERVERTEBRAL DISC DEGENERATION, LUMBAR REGION: ICD-10-CM

## 2023-02-01 PROCEDURE — 99213 OFFICE O/P EST LOW 20 MIN: CPT

## 2023-02-01 RX ORDER — MELOXICAM 15 MG/1
15 TABLET ORAL DAILY
Qty: 30 | Refills: 2 | Status: ACTIVE | COMMUNITY
Start: 2023-02-01 | End: 1900-01-01

## 2023-03-08 NOTE — HISTORY OF PRESENT ILLNESS
[Lower back] : lower back [6] : 6 [3] : 3 [Dull/Aching] : dull/aching [Intermittent] : intermittent [Nothing helps with pain getting better] : Nothing helps with pain getting better [de-identified] : 2/1/23: Patient presents for follow up and MRI review. Patient is experiencing the same symptoms from last visit. \par \par 12/16/22: 36 y/o female presents with re-evaluation of chronic intermittent low back pain. Patient reports that she experiences persistent mechanical symptoms in her lower back despite ongoing physical therapy trial. Due to worsening pain and instability with mechanical symptoms, patient will obtain MRI lumbar spine. Patient has proven refractory to over 6 weeks of previous conservative treatments including physical therapy and HEP. \par \par 10/19/22: 36 y/o female presents for re-evaluation. Patient reports that since her last visit she completed her PT trials and has been following the HEP provided to her previously. Patient reports a recurrence of pain 1 week ago that started after she lifted her child. Patient c/o lower back pain radiating into her bilateral hips.  [] : no [de-identified] : MRI done at [facility]

## 2023-03-08 NOTE — DATA REVIEWED
[FreeTextEntry1] : On my interpretation of these images from  [facility] on 1/2/23\par L5-S1: small central disc herniation, mild DDD, mild foraminal stenosis\par L4-5: central disc protrusion, modic changes, mild to mod stenosis \par L3-4: normal  \par L2-3: normal \par L1-2: normal

## 2023-03-08 NOTE — ASSESSMENT
[FreeTextEntry1] : 36 y/o female with disc herniation and intermittent chronic low back pain. Reviewed MRI in office today. I discussed with the patient that regular exercise can help to improve her pain. Patient was provided with a referral for lumbar physical therapy to work on stretching, strengthening and range of motion. Patient was provided with a lumbar home exercise program. Patient will continue NSAIDs prn. I am prescribing the patient Meloxicam (15mg x7-10 days then 7.5mg up to BID PRN) for pain relief. Titration Schedule Provided. Discussed non-surgical and surgical options if her pain worsens. Patient will follow up PRN.  \par \par Prior to appointment and during encounter with patient extensive medical records were reviewed including but not limited to, hospital records, out patient records, imaging results, and lab data. During this appointment the patient was examined, diagnoses were discussed and explained in a face to face manner. In addition extensive time was spent reviewing aforementioned diagnostic studies. Counseling including abnormal image results, differential diagnoses, treatment options, risk and benefits, lifestyle changes, current condition, and current medications was performed. Patient's comments, questions, and concerns were address and patient verbalized understanding. Based on this patient's presentation at our office, which is an orthopedic spine surgeon's office, this patient inherently / intrinsically has a risk, however minute, of developing issues such as Cauda equina syndrome, bowel and bladder changes, or progression of motor or neurological deficits such as paralysis which may be permanent.\par \par I, Lauren Sinclair, attest that this documentation has been prepared under the direction and in the presence of provider Krzysztof Mcqueen MD.

## 2023-03-08 NOTE — PHYSICAL EXAM
[5___] : right extensor hallicus longus 5[unfilled]/5 [de-identified] : Constitutional:\par -  General Appearance: \par Unremarkable\par Body Habitus\par Well Developed \par Well Nourished\par Body Habitus\par No Deformities\par Well Groomed\par \par Ability To communicate:\par Normal\par \par Neurologic: \par Global sensation is intact to upper and lower extremities.  See examination of Neck and/or Spine for exceptions.\par Orientation to Time, Place and Person is: Normal\par Mood And Affect is Normal\par \par Skin:\par -  Head/Face, Right Upper/Lower Extremity, Left Upper/Lower Extremity: Normal\par See Examination of Neck and/or Spine for exceptions\par \par Cardiovascular:\par Peripheral Cardiovascular System is Normal\par \par Palpation of Lymph Nodes:\par Normal Palpation of lymph nodes in: Axilla, Cervical, Inguinal\par Abnormal Palpation of lymph nodes in: None  [] : clonus not sustained at ankle [FreeTextEntry8] : Right PSIS pain

## 2023-03-15 ENCOUNTER — TRANSCRIPTION ENCOUNTER (OUTPATIENT)
Age: 38
End: 2023-03-15

## 2023-03-21 ENCOUNTER — APPOINTMENT (OUTPATIENT)
Dept: GASTROENTEROLOGY | Facility: CLINIC | Age: 38
End: 2023-03-21

## 2023-11-14 ENCOUNTER — APPOINTMENT (OUTPATIENT)
Dept: GASTROENTEROLOGY | Facility: CLINIC | Age: 38
End: 2023-11-14

## 2024-01-30 ENCOUNTER — OFFICE (OUTPATIENT)
Dept: URBAN - METROPOLITAN AREA CLINIC 6 | Facility: CLINIC | Age: 39
Setting detail: OPHTHALMOLOGY
End: 2024-01-30
Payer: COMMERCIAL

## 2024-01-30 DIAGNOSIS — H25.13: ICD-10-CM

## 2024-01-30 DIAGNOSIS — H40.013: ICD-10-CM

## 2024-01-30 DIAGNOSIS — H01.004: ICD-10-CM

## 2024-01-30 DIAGNOSIS — H01.001: ICD-10-CM

## 2024-01-30 DIAGNOSIS — H01.005: ICD-10-CM

## 2024-01-30 DIAGNOSIS — H01.002: ICD-10-CM

## 2024-01-30 PROCEDURE — 92083 EXTENDED VISUAL FIELD XM: CPT | Performed by: OPHTHALMOLOGY

## 2024-01-30 PROCEDURE — 92014 COMPRE OPH EXAM EST PT 1/>: CPT | Performed by: OPHTHALMOLOGY

## 2024-01-30 PROCEDURE — 92133 CPTRZD OPH DX IMG PST SGM ON: CPT | Performed by: OPHTHALMOLOGY

## 2024-01-30 ASSESSMENT — LID EXAM ASSESSMENTS
OD_BLEPHARITIS: RLL RUL
OS_BLEPHARITIS: LLL LUL

## 2024-01-30 ASSESSMENT — REFRACTION_AUTOREFRACTION
OS_CYLINDER: -0.50
OD_CYLINDER: -0.25
OS_SPHERE: +0.75
OS_AXIS: 075
OD_SPHERE: +0.50
OD_AXIS: 081

## 2024-01-30 ASSESSMENT — CONFRONTATIONAL VISUAL FIELD TEST (CVF)
OD_FINDINGS: FULL
OS_FINDINGS: FULL

## 2024-01-30 ASSESSMENT — SPHEQUIV_DERIVED
OS_SPHEQUIV: 0.5
OD_SPHEQUIV: 0.125
OS_SPHEQUIV: 0.5
OD_SPHEQUIV: 0.375

## 2024-01-30 ASSESSMENT — REFRACTION_MANIFEST
OS_CYLINDER: -0.50
OS_AXIS: 60
OD_AXIS: 128
OD_CYLINDER: -0.25
OS_SPHERE: +0.75
OD_SPHERE: +0.25

## 2024-04-08 NOTE — ED ADULT NURSE NOTE - CAS EDN INTEG ASSESS
"Chief Complaint  Earache (Shooting pain in right ear)    Subjective            Lyndsay Perez is a 33 y.o. female who presents to [facility]   History of Present Illness  She is here today with complaints of her right ear having occasional shooting pain in both ears feeling full with static/muffled sounds.  She states she has been having symptoms for about 3 weeks.    PHQ-2 Depression Screening  Little interest or pleasure in doing things? 0-->not at all   Feeling down, depressed, or hopeless? 0-->not at all   PHQ-2 Total Score 0       Tobacco Use: Low Risk  (4/8/2024)    Patient History     Smoking Tobacco Use: Never     Smokeless Tobacco Use: Never     Passive Exposure: Not on file      E-cigarette/Vaping    E-cigarette/Vaping Use Never User      E-cigarette/Vaping Substances     E-cigarette/Vaping Devices       Alcohol Use: Not At Risk (8/29/2021)    [facility]    AUDIT-C     Frequency of Alcohol Consumption: Never     Average Number of Drinks: Not on file     Frequency of Binge Drinking: Not on file         Objective   Vital Signs:   Vitals:    04/08/24 1115   BP: 129/70   BP Location: Left arm   Patient Position: Sitting   Cuff Size: Adult   Pulse: 86   Temp: 98 °F (36.7 °C)   SpO2: 96%   Weight: 72.8 kg (160 lb 9.6 oz)   Height: 158.9 cm (62.56\")     Body mass index is 28.85 kg/m².    Wt Readings from Last 3 Encounters:   04/08/24 72.8 kg (160 lb 9.6 oz)   03/27/24 73.7 kg (162 lb 8 oz)   02/22/24 72.1 kg (159 lb)     BP Readings from Last 3 Encounters:   04/08/24 129/70   03/27/24 112/76   02/22/24 115/81       Health Maintenance   Topic Date Due    ANNUAL PHYSICAL  Never done    COVID-19 Vaccine (1 - 2023-24 season) 10/28/2024 (Originally 9/1/2023)    INFLUENZA VACCINE  08/01/2024    PAP SMEAR  02/01/2025    BMI FOLLOWUP  03/27/2025    TDAP/TD VACCINES (3 - Td or Tdap) 09/15/2030    HEPATITIS C SCREENING  Completed    " "Pneumococcal Vaccine 0-64  Aged Out       /70 (BP Location: Left arm, Patient Position: Sitting, Cuff Size: Adult)   Pulse 86   Temp 98 °F (36.7 °C)   Ht 158.9 cm (62.56\")   Wt 72.8 kg (160 lb 9.6 oz)   SpO2 96%   BMI 28.85 kg/m²       Current Outpatient Medications:     busPIRone (BUSPAR) 10 MG tablet, Take 1 tablet by mouth 3 times a day., Disp: , Rfl:     busPIRone (BUSPAR) 30 MG tablet, Take 1 tablet by mouth., Disp: , Rfl:     estradiol (VIVELLE-DOT) 0.1 MG/24HR patch, Place 1 patch on the skin as directed by provider 2 (Two) Times a Week., Disp: 8 patch, Rfl: 0    ferrous sulfate 325 (65 FE) MG tablet, TAKE 1 TABLET BY MOUTH EVERY DAY WITH BREAKFAST, Disp: 30 tablet, Rfl: 0    FLUoxetine (PROzac) 20 MG capsule, Take 3 capsules by mouth Daily., Disp: , Rfl:     furosemide (LASIX) 20 MG tablet, Take 1 tablet by mouth Daily., Disp: 90 tablet, Rfl: 1    hydrOXYzine pamoate (VISTARIL) 25 MG capsule, Take 1 capsule by mouth 3 (Three) Times a Day As Needed for Anxiety., Disp: , Rfl:     Levonorgestrel (MIRENA) 20 MCG/DAY intrauterine device IUD, 1 each by Intrauterine route Every 8 (Eight) Years., Disp: , Rfl:     multivitamin with minerals tablet tablet, Take 1 tablet by mouth Daily., Disp: , Rfl:     omeprazole (priLOSEC) 20 MG capsule, Take 1 capsule by mouth Daily., Disp: , Rfl:     phentermine 37.5 MG capsule, Take 1 capsule by mouth Every Morning., Disp: 90 capsule, Rfl: 0    prazosin (MINIPRESS) 1 MG capsule, Take 1 capsule by mouth every night at bedtime., Disp: , Rfl:     propranolol (INDERAL) 20 MG tablet, Take 1 tablet by mouth 3 (Three) Times a Day As Needed., Disp: , Rfl:     Synthroid 50 MCG tablet, Take 1 tablet by mouth Daily., Disp: , Rfl:     valACYclovir (Valtrex) 500 MG tablet, Take 1 tablet by mouth Daily. (Patient taking differently: Take 1 tablet by mouth As Needed.), Disp: 30 tablet, Rfl: 2    vitamin B-12 (CYANOCOBALAMIN) 1000 MCG tablet, Take 1 tablet by mouth Daily. (Patient " taking differently: Take 1 tablet by mouth Every Night.), Disp: 90 tablet, Rfl: 1    vitamin D3 (Vitamin D) 125 MCG (5000 UT) capsule capsule, Take 1 capsule by mouth Daily., Disp: 90 capsule, Rfl: 1    cetirizine (zyrTEC) 10 MG tablet, Take 1 tablet by mouth Every Night. Indications: Perennial Allergic Rhinitis, Disp: 30 tablet, Rfl: 5    fluticasone (FLONASE) 50 MCG/ACT nasal spray, 2 sprays into the nostril(s) as directed by provider Daily. 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   Constitutional:       Appearance: Normal appearance. She is well-developed.   HENT:      Right Ear: Ear canal and external ear normal. A middle ear effusion is present.      Left Ear: Ear canal and external ear normal. A middle ear effusion is present.      Mouth/Throat:      Mouth: Mucous membranes are moist.      Pharynx: No pharyngeal swelling, oropharyngeal exudate or posterior oropharyngeal erythema.      Comments: Postnasal drainage noted.  Neck:      Thyroid: No thyroid mass, thyromegaly or thyroid tenderness.   Cardiovascular:      Rate and Rhythm: Normal rate and regular rhythm.      Heart sounds: No murmur heard.     No friction rub. No gallop.   Pulmonary:      Effort: Pulmonary effort is normal.      Breath sounds: Normal breath sounds. No wheezing or rhonchi.   Lymphadenopathy:      Cervical: No cervical adenopathy.    Skin:     General: Skin is warm and dry.   Neurological:      Mental Status: She is alert and oriented to person, place, and time.      Cranial Nerves: No cranial nerve deficit.   Psychiatric:         Mood and Affect: Mood and affect normal.         Behavior: Behavior normal.         Thought Content: Thought content normal. Thought content does not include homicidal or suicidal ideation.         Judgment: Judgment normal.       Assessment & Plan  Dysfunction of both eustachian tubes    Seasonal allergic rhinitis, unspecified trigger  I will start her on Zyrtec daily and Flonase nasal spray as needed.  She does have an allergy to prednisone so I did discuss with her that Flonase has a steroid but it is topical and she may not have any reaction.  I did advise her if she starts having any palpitations or other symptoms to stop using Flonase.  Patient verbalizes understanding.     New Medications Ordered This Visit   Medications    cetirizine (zyrTEC) 10 MG tablet     Sig: Take 1 tablet by mouth Every Night. Indications: Perennial Allergic Rhinitis     Dispense:  30 tablet     Refill:  5    fluticasone (FLONASE) 50 MCG/ACT nasal spray     Si sprays into the nostril(s) as directed by provider Daily. Indications: Stuffy Nose, ear pain     Dispense:  16 g     Refill:  5          Diagnosis Plan   1. Dysfunction of both eustachian tubes  cetirizine (zyrTEC) 10 MG tablet    fluticasone (FLONASE) 50 MCG/ACT nasal spray      2. Seasonal allergic rhinitis, unspecified trigger  cetirizine (zyrTEC) 10 MG tablet    fluticasone (FLONASE) 50 MCG/ACT nasal spray            FOLLOW UP  Return for Annual physical.  Patient states she will call back and schedule an appointment for an annual physical and to discuss other issues.  Patient was given instructions and counseling regarding her condition or for health maintenance advice. Please see specific information pulled into the AVS if appropriate.       CURRENT & DISCONTINUED  MEDICATIONS  Current Outpatient Medications   Medication Instructions    busPIRone (BUSPAR) 10 MG tablet 1 tablet, Oral, 3 times daily    busPIRone (BUSPAR) 30 mg, Oral    cetirizine (ZYRTEC) 10 mg, Oral, Nightly    estradiol (VIVELLE-DOT) 0.1 MG/24HR patch 1 patch, Transdermal, 2 Times Weekly    ferrous sulfate 325 (65 FE) MG tablet TAKE 1 TABLET BY MOUTH EVERY DAY WITH BREAKFAST    FLUoxetine (PROZAC) 60 mg, Oral, Daily    fluticasone (FLONASE) 50 MCG/ACT nasal spray 2 sprays, Nasal, Daily    furosemide (LASIX) 20 mg, Oral, Daily    hydrOXYzine pamoate (VISTARIL) 25 mg, Oral, 3 Times Daily PRN    Levonorgestrel (MIRENA) 20 MCG/DAY intrauterine device IUD 1 each, Intrauterine, Every 8 Years    multivitamin with minerals tablet tablet 1 tablet, Oral, Daily    omeprazole (PRILOSEC) 20 mg, Oral, Daily    phentermine 37.5 mg, Oral, Every Morning    prazosin (MINIPRESS) 1 mg, Oral, Every Night at Bedtime    propranolol (INDERAL) 20 mg, Oral, 3 Times Daily PRN    Synthroid 50 mcg, Oral, Daily    valACYclovir (VALTREX) 500 mg, Oral, Daily    vitamin B-12 (CYANOCOBALAMIN) 1,000 mcg, Oral, Daily    vitamin D3 5,000 Units, Oral, Daily       There are no discontinued medications.     Parts of this note are electronic transcriptions/translations of spoken language to printed text using the Dragon Dictation system.    Dyan Alba, NANCY  04/08/24  11:46 EDT     WDL

## 2024-07-06 ENCOUNTER — NON-APPOINTMENT (OUTPATIENT)
Age: 39
End: 2024-07-06

## 2024-07-12 ENCOUNTER — NON-APPOINTMENT (OUTPATIENT)
Age: 39
End: 2024-07-12

## 2025-03-30 ENCOUNTER — OFFICE (OUTPATIENT)
Dept: URBAN - METROPOLITAN AREA CLINIC 6 | Facility: CLINIC | Age: 40
Setting detail: OPHTHALMOLOGY
End: 2025-03-30
Payer: COMMERCIAL

## 2025-03-30 DIAGNOSIS — H01.005: ICD-10-CM

## 2025-03-30 DIAGNOSIS — H01.001: ICD-10-CM

## 2025-03-30 DIAGNOSIS — H40.013: ICD-10-CM

## 2025-03-30 DIAGNOSIS — H01.004: ICD-10-CM

## 2025-03-30 DIAGNOSIS — H25.13: ICD-10-CM

## 2025-03-30 DIAGNOSIS — H01.002: ICD-10-CM

## 2025-03-30 PROCEDURE — 92014 COMPRE OPH EXAM EST PT 1/>: CPT | Performed by: OPHTHALMOLOGY

## 2025-03-30 PROCEDURE — 92083 EXTENDED VISUAL FIELD XM: CPT | Performed by: OPHTHALMOLOGY

## 2025-03-30 PROCEDURE — 92250 FUNDUS PHOTOGRAPHY W/I&R: CPT | Performed by: OPHTHALMOLOGY

## 2025-03-30 ASSESSMENT — CONFRONTATIONAL VISUAL FIELD TEST (CVF)
OS_FINDINGS: FULL
OD_FINDINGS: FULL

## 2025-03-30 ASSESSMENT — REFRACTION_AUTOREFRACTION
OS_AXIS: 080
OS_CYLINDER: -0.25
OD_SPHERE: +0.25
OD_CYLINDER: SPHERE
OS_SPHERE: +0.75

## 2025-03-30 ASSESSMENT — KERATOMETRY
OD_AXISANGLE_DEGREES: 061
OS_K2POWER_DIOPTERS: 42.00
OD_K1POWER_DIOPTERS: 41.50
OS_AXISANGLE_DEGREES: 138
OS_K1POWER_DIOPTERS: 41.50
OD_K2POWER_DIOPTERS: 42.00
METHOD_AUTO_MANUAL: AUTO

## 2025-03-30 ASSESSMENT — REFRACTION_MANIFEST
OS_VA1: 20/20
OU_VA: 20/20
OS_AXIS: 080
OD_VA1: 20/20
OS_SPHERE: +0.75
OD_SPHERE: +0.25
OD_CYLINDER: SPHERE
OS_CYLINDER: -0.25

## 2025-03-30 ASSESSMENT — TONOMETRY
OS_IOP_MMHG: 21
OD_IOP_MMHG: 19

## 2025-03-30 ASSESSMENT — LID EXAM ASSESSMENTS
OS_BLEPHARITIS: LLL LUL
OD_BLEPHARITIS: RLL RUL

## 2025-03-30 ASSESSMENT — PACHYMETRY
OD_CT_CORRECTION: 1
OD_CT_UM: 538
OS_CT_UM: 532
OS_CT_CORRECTION: 1

## 2025-03-30 ASSESSMENT — VISUAL ACUITY
OD_BCVA: 20/20
OS_BCVA: 20/20